# Patient Record
Sex: FEMALE | Employment: PART TIME | URBAN - METROPOLITAN AREA
[De-identification: names, ages, dates, MRNs, and addresses within clinical notes are randomized per-mention and may not be internally consistent; named-entity substitution may affect disease eponyms.]

---

## 2021-06-22 ENCOUNTER — TELEPHONE (OUTPATIENT)
Dept: NEUROLOGY | Facility: CLINIC | Age: 56
End: 2021-06-22

## 2021-06-22 NOTE — TELEPHONE ENCOUNTER
1st attempt to schedule Neurology referral- external referral not from work queue.- faxed referral

## 2021-09-03 ENCOUNTER — OFFICE VISIT (OUTPATIENT)
Dept: NEUROLOGY | Facility: CLINIC | Age: 56
End: 2021-09-03
Attending: PSYCHIATRY & NEUROLOGY
Payer: COMMERCIAL

## 2021-09-03 VITALS
SYSTOLIC BLOOD PRESSURE: 126 MMHG | HEART RATE: 64 BPM | DIASTOLIC BLOOD PRESSURE: 81 MMHG | OXYGEN SATURATION: 98 % | WEIGHT: 218.4 LBS

## 2021-09-03 DIAGNOSIS — Z82.0 FAMILY HISTORY OF PARKINSON'S DISEASE: Primary | ICD-10-CM

## 2021-09-03 PROCEDURE — 99204 OFFICE O/P NEW MOD 45 MIN: CPT | Performed by: PSYCHIATRY & NEUROLOGY

## 2021-09-03 PROCEDURE — G0463 HOSPITAL OUTPT CLINIC VISIT: HCPCS

## 2021-09-03 RX ORDER — PHENTERMINE HYDROCHLORIDE 37.5 MG/1
37.5 TABLET ORAL
COMMUNITY
Start: 2021-03-12

## 2021-09-03 RX ORDER — ASPIRIN 81 MG/1
TABLET, CHEWABLE ORAL
COMMUNITY

## 2021-09-03 RX ORDER — METFORMIN HCL 500 MG
1000 TABLET, EXTENDED RELEASE 24 HR ORAL
COMMUNITY
Start: 2021-06-11

## 2021-09-03 RX ORDER — LEVOTHYROXINE SODIUM 112 UG/1
112 TABLET ORAL
COMMUNITY
Start: 2021-06-11

## 2021-09-03 NOTE — PROGRESS NOTES
INITIAL NEUROLOGY CONSULTATION    DATE OF VISIT: 9/3/2021  CLINIC LOCATION: [facility]  MRN: [id number]  PATIENT NAME: Zuleika Mcgill  YOB: 1965    PRIMARY CARE PROVIDER: Ameena Eli PA-C.     REASON FOR VISIT:   Chief Complaint   Patient presents with     Parkinson     Long family history of Parkinson's     HISTORY OF PRESENT ILLNESS:                                                    Ms. Zuleika Mcgill is 56 year old right handed female patient with past medical history of hypertension, hyperlipidemia, obstructive sleep apnea, hypothyroidism, and vitamin D deficiency, who was seen in consultation today requested by Ameena Eli PA-C,  for family history of Parkinson's disease.    Per patient's report, she would like to get evaluated for possibility of Parkinson's disease because her brother was recently diagnosed with that, and her mother also had it.  Currently, she denies any neurological complaints, including anosmia, bradykinesia, acting out in sleep, micrographia, tremor, hypomimia, hypophonia, postural instability, and frequent falls.  She also denies any focal numbness or weakness.  No prior history of significant head injuries, seizures, or CNS infections.    According to Care Everywhere, recent laboratory evaluation from June 2021 includes unremarkable BMP, LFT, magnesium, CBC, vitamin B12 (432), vitamin D (42.5), ferritin (149.8), elevated LDL (174), and normal TSH (4.68).    No prior brain or spine imaging.    Review of Systems - the patient endorses swollen feet, varicose veins, thyroid problems, and arthritis. Otherwise, she denies any other complaints on 14-point comprehensive review of systems.  PAST MEDICAL/SURGICAL HISTORY:                                                    I personally reviewed patient's past medical and surgical history with the patient at today's visit.  MEDICATIONS:                                                    I  personally reviewed patient's medications and allergies with the patient at today's visit.  levothyroxine (SYNTHROID) 112 MCG tablet, Take 112 mcg by mouth  metFORMIN (GLUCOPHAGE-XR) 500 MG 24 hr tablet, Take 1,000 mg by mouth  phentermine (ADIPEX-P) 37.5 MG tablet, Take 37.5 mg by mouth  aspirin (ASA) 81 MG chewable tablet,     No current facility-administered medications on file prior to visit.    ALLERGIES:                                                    No Known Allergies  FAMILY/SOCIAL HISTORY:                                                    Family and social history was reviewed with the patient at today's visit.  Family history is positive for dementia and Parkinson's disease.  , lives with her .  Never smoker.  Denies current alcohol and recreational drug use.  Works full-time.  REVIEW OF SYSTEMS:                                                    Patient has completed a [facility] Patient Health History, including a 14-system review, which was personally reviewed, and pertinent positives are listed in HPI. She denies any additional problems on the further questioning.  EXAM:                                                    VITAL SIGNS:   BP (!) 145/86   Pulse 65   Wt 99.1 kg (218 lb 6.4 oz)   SpO2 98%    Repeat vital signs: /81   Pulse 64   Wt 99.1 kg (218 lb 6.4 oz)   SpO2 98% .  Mini-Cog Assessment:  Mini Cog Assessment  Clock Draw Score: 2 Normal  3 Item Recall: 3 objects recalled  Mini Cog Total Score: 5  Administered by: : Capri RAJPUT    General: pt is in NAD, cooperative.  Skin: normal turgor, moist mucous membranes, no lesions/rashes noticed.  HEENT: ATNC, EOMI, PERRL, white sclera, normal conjunctiva, no nystagmus or ptosis. No carotid bruits bilaterally.  Respiratory: lung sounds clear to auscultation bilaterally, no crackles, wheezes, rhonchi. Symmetric lung excursion, no accessory respiratory muscle use.  Cardiovascular: normal S1/S2, no  murmurs/rubs/gallops.   Abdomen: Not distended.  : deferred.    Neurological:  Mental: alert, follows commands, Mini Cog Total Score: 5/5 with 3/3 on memory recall, no aphasia or dysarthria. Fund of knowledge is appropriate for age.  Cranial Nerves:  CN II: visual acuity - able to accurately count fingers with each eye. Visual fields intact, fundi: discs sharp, no papilledema and normal vessels bilaterally.  CN III, IV, VI: EOM intact, pupils equal and reactive  CN V: facial sensation nl  CN VII: face symmetric, no facial droop  CN VIII: hearing normal  CN IX: palate elevation symmetric, uvula at midline  CN XI SCM normal, shoulder shrug nl  CN XII: tongue midline  Motor: Strength: 5/5 in all major groups of all extremities. Normal tone. No abnormal movements. No pronator drift b/l.  Reflexes: Triceps, biceps, brachioradialis reflexes normal and symmetric, patellar reflexes are difficult to elicit bilaterally, achilles reflex is slightly reduced on the right and normal on the left. No clonus noted. Toes are down-going b/l.   Sensory: temperature, light touch, pinprick, and vibration intact. Romberg: negative.  Coordination: FNF and heel-shin tests intact b/l.   Gait:  Normal, able to tandem, toe, and heel walk.  DATA:   LABS/IMAGING/OTHER STUDIES: I reviewed pertinent medical records, including Care Everywhere, as detailed in the history of present illness.  ASSESSMENT AND PLAN:      ASSESSMENT: Zuleika Mcgill is a 56 year old female patient with listed above past medical history, who presents for evaluation regarding possibility of Parkinson's disease due to positive family history.    We had a detailed discussion with the patient regarding her presenting complaints.  The neurological exam today is non-focal, except mild reduction of the right achilles reflex (likely chronic and unrelated to her present concerns).  Currently, she does not have any evidence of Parkinson's disease.  We reviewed typical  symptoms, and I advised the patient to come back if she develops them in the future.    Zuleika to follow up with Primary Care provider regarding elevated blood pressure.     DIAGNOSES:    ICD-10-CM    1. Family history of Parkinson's disease  Z82.0      PLAN: At today's visit we thoroughly discussed symptoms and signs of Parkinson's disease, disease time course, available treatment options, and the plan.  At the present time, I do not see any evidence of it.  I advised the patient to come back if she notices any new neurological symptoms.    Next follow-up appointment is on as needed basis.    Total Time: 45 minutes spent on the date of the encounter doing chart review, history and exam, documentation and further activities per the note.    Nicanor Farnsworth MD  [facility]  (Chart documentation was completed in part with Dragon voice-recognition software. Even though reviewed, some grammatical, spelling, and word errors may remain.)

## 2021-09-03 NOTE — PATIENT INSTRUCTIONS
AFTER VISIT SUMMARY (AVS):    At today's visit we thoroughly discussed symptoms and signs of Parkinson's disease, disease time course, available treatment options, and the plan.  At the present time, I do not see any evidence of it.  Please come back if you notice any new neurological symptoms.    Next follow-up appointment is on as needed basis.    Please do not hesitate to call me with any questions or concerns.    Thanks.

## 2021-09-03 NOTE — LETTER
9/3/2021         RE: Zuleika Mcgill  [address] [facility] [address]        Dear Colleague,    Thank you for referring your patient, Zuleika Mcgill, to the [facility] [place]. Please see a copy of my visit note below.    INITIAL NEUROLOGY CONSULTATION    DATE OF VISIT: 9/3/2021  CLINIC LOCATION: [facility]  MRN: [id number]  PATIENT NAME: Zuleika Mcgill  YOB: 1965    PRIMARY CARE PROVIDER: Ameena Eli PA-C.     REASON FOR VISIT:   Chief Complaint   Patient presents with     Parkinson     Long family history of Parkinson's     HISTORY OF PRESENT ILLNESS:                                                    Ms. Zuleika Mcgill is 56 year old right handed female patient with past medical history of hypertension, hyperlipidemia, obstructive sleep apnea, hypothyroidism, and vitamin D deficiency, who was seen in consultation today requested by Ameena Eli PA-C,  for family history of Parkinson's disease.    Per patient's report, she would like to get evaluated for possibility of Parkinson's disease because her brother was recently diagnosed with that, and her mother also had it.  Currently, she denies any neurological complaints, including anosmia, bradykinesia, acting out in sleep, micrographia, tremor, hypomimia, hypophonia, postural instability, and frequent falls.  She also denies any focal numbness or weakness.  No prior history of significant head injuries, seizures, or CNS infections.    According to Care Everywhere, recent laboratory evaluation from June 2021 includes unremarkable BMP, LFT, magnesium, CBC, vitamin B12 (432), vitamin D (42.5), ferritin (149.8), elevated LDL (174), and normal TSH (4.68).    No prior brain or spine imaging.    Review of Systems - the patient endorses swollen feet, varicose veins, thyroid problems, and arthritis. Otherwise, she denies any other complaints on 14-point comprehensive review of  systems.  PAST MEDICAL/SURGICAL HISTORY:                                                    I personally reviewed patient's past medical and surgical history with the patient at today's visit.  MEDICATIONS:                                                    I personally reviewed patient's medications and allergies with the patient at today's visit.  levothyroxine (SYNTHROID) 112 MCG tablet, Take 112 mcg by mouth  metFORMIN (GLUCOPHAGE-XR) 500 MG 24 hr tablet, Take 1,000 mg by mouth  phentermine (ADIPEX-P) 37.5 MG tablet, Take 37.5 mg by mouth  aspirin (ASA) 81 MG chewable tablet,     No current facility-administered medications on file prior to visit.    ALLERGIES:                                                    No Known Allergies  FAMILY/SOCIAL HISTORY:                                                    Family and social history was reviewed with the patient at today's visit.  Family history is positive for dementia and Parkinson's disease.  , lives with her .  Never smoker.  Denies current alcohol and recreational drug use.  Works full-time.  REVIEW OF SYSTEMS:                                                    Patient has completed a [facility] Patient Health History, including a 14-system review, which was personally reviewed, and pertinent positives are listed in HPI. She denies any additional problems on the further questioning.  EXAM:                                                    VITAL SIGNS:   BP (!) 145/86   Pulse 65   Wt 99.1 kg (218 lb 6.4 oz)   SpO2 98%    Repeat vital signs: /81   Pulse 64   Wt 99.1 kg (218 lb 6.4 oz)   SpO2 98% .  Mini-Cog Assessment:  Mini Cog Assessment  Clock Draw Score: 2 Normal  3 Item Recall: 3 objects recalled  Mini Cog Total Score: 5  Administered by: : Capri RAJPUT    General: pt is in NAD, cooperative.  Skin: normal turgor, moist mucous membranes, no lesions/rashes noticed.  HEENT: ATNC, EOMI, PERRL, white sclera, normal conjunctiva, no  nystagmus or ptosis. No carotid bruits bilaterally.  Respiratory: lung sounds clear to auscultation bilaterally, no crackles, wheezes, rhonchi. Symmetric lung excursion, no accessory respiratory muscle use.  Cardiovascular: normal S1/S2, no murmurs/rubs/gallops.   Abdomen: Not distended.  : deferred.    Neurological:  Mental: alert, follows commands, Mini Cog Total Score: 5/5 with 3/3 on memory recall, no aphasia or dysarthria. Fund of knowledge is appropriate for age.  Cranial Nerves:  CN II: visual acuity - able to accurately count fingers with each eye. Visual fields intact, fundi: discs sharp, no papilledema and normal vessels bilaterally.  CN III, IV, VI: EOM intact, pupils equal and reactive  CN V: facial sensation nl  CN VII: face symmetric, no facial droop  CN VIII: hearing normal  CN IX: palate elevation symmetric, uvula at midline  CN XI SCM normal, shoulder shrug nl  CN XII: tongue midline  Motor: Strength: 5/5 in all major groups of all extremities. Normal tone. No abnormal movements. No pronator drift b/l.  Reflexes: Triceps, biceps, brachioradialis reflexes normal and symmetric, patellar reflexes are difficult to elicit bilaterally, achilles reflex is slightly reduced on the right and normal on the left. No clonus noted. Toes are down-going b/l.   Sensory: temperature, light touch, pinprick, and vibration intact. Romberg: negative.  Coordination: FNF and heel-shin tests intact b/l.   Gait:  Normal, able to tandem, toe, and heel walk.  DATA:   LABS/IMAGING/OTHER STUDIES: I reviewed pertinent medical records, including Care Everywhere, as detailed in the history of present illness.  ASSESSMENT AND PLAN:      ASSESSMENT: Zuleika Mcgill is a 56 year old female patient with listed above past medical history, who presents for evaluation regarding possibility of Parkinson's disease due to positive family history.    We had a detailed discussion with the patient regarding her presenting complaints.  The  neurological exam today is non-focal, except mild reduction of the right achilles reflex (likely chronic and unrelated to her present concerns).  Currently, she does not have any evidence of Parkinson's disease.  We reviewed typical symptoms, and I advised the patient to come back if she develops them in the future.    Zuleika to follow up with Primary Care provider regarding elevated blood pressure.     DIAGNOSES:    ICD-10-CM    1. Family history of Parkinson's disease  Z82.0      PLAN: At today's visit we thoroughly discussed symptoms and signs of Parkinson's disease, disease time course, available treatment options, and the plan.  At the present time, I do not see any evidence of it.  I advised the patient to come back if she notices any new neurological symptoms.    Next follow-up appointment is on as needed basis.    Total Time: 45 minutes spent on the date of the encounter doing chart review, history and exam, documentation and further activities per the note.    Nicanor Farnsworth MD  [facility]  (Chart documentation was completed in part with Dragon voice-recognition software. Even though reviewed, some grammatical, spelling, and word errors may remain.)              Again, thank you for allowing me to participate in the care of your patient.        Sincerely,        Nicanor Farnsworth MD

## 2025-05-15 NOTE — PROGRESS NOTES
"ENT Consultation    Zuleika Mcgill who is a 60 year old female seen in consultation at the request of self.      History of Present Illness - Zuleika Mcgill is a 60 year old female presents with several month history of several issues the most important one is severe ear itch.  She feels a very very deep almost like she has to tear the ear off to itch in the back of the ear the back of the throat.  That comes and goes.  This started maybe after upper respiratory infection.  She also was clearing and coughing at the time.  Now actually feels that her voice is little bit more raspy in the last couple months.  Feels little bit of globus sensation even though denies any common variety GERD symptoms.  Feels \"\"scratchiness\" in the back of the throat by the hyoid area.  Feels some also thick secretions posteriorly.  No significant ear plugging eustachian tube dysfunction symptoms.      Patient is currently on Ozempic being prediabetic and overweight.  BP Readings from Last 1 Encounters:   05/28/25 122/76       BP noted to be well controlled today in office.     Zuleika IS NOT a smoker/uses chewing tobacco.      Past Medical History - History reviewed. No pertinent past medical history.    Current Medications -   Current Outpatient Medications:     aspirin (ASA) 81 MG chewable tablet, , Disp: , Rfl:     levothyroxine (SYNTHROID) 112 MCG tablet, Take 112 mcg by mouth, Disp: , Rfl:     metFORMIN (GLUCOPHAGE-XR) 500 MG 24 hr tablet, Take 1,000 mg by mouth, Disp: , Rfl:     OZEMPIC, 2 MG/DOSE, 8 MG/3ML pen, Inject 2 mg subcutaneously once a week., Disp: , Rfl:     phentermine (ADIPEX-P) 37.5 MG tablet, Take 37.5 mg by mouth, Disp: , Rfl:     Allergies -   Allergies   Allergen Reactions    Banana Other (See Comments)     Smell bothers pt  Smell bothers pt      Latex Swelling     Other reaction(s): *Unknown  Banana allergy- latex precautions  Banana allergy- latex precautions      Levothyroxine Itching and Other (See " "Comments)     Facial swelling  Allergic to the one with the yellow coating  Facial swelling  Allergic to the one with the yellow coating      Amoxicillin Rash     Causes yeast infections  Causes yeast infections         Social History -   Social History     Socioeconomic History    Marital status:    Tobacco Use    Smoking status: Never    Smokeless tobacco: Never   Substance and Sexual Activity    Alcohol use: Not Currently    Drug use: Never       Family History - History reviewed. No pertinent family history.    Review of Systems - As per HPI and PMHx, otherwise review of system review of the head and neck negative. Otherwise 10+ review of system is negative    Physical Exam  /76   Temp 97.2  F (36.2  C) (Temporal)   Ht 1.609 m (5' 3.35\")   Wt 94.8 kg (208 lb 15.9 oz)   BMI 36.62 kg/m    BMI: Body mass index is 36.62 kg/m .    General - The patient is well nourished and well developed, and appears to have good nutritional status.  Alert and oriented to person and place, answers questions and cooperates with examination appropriately.    SKIN - No suspicious lesions or rashes.  Respiration - No respiratory distress.  Head and Face - Normocephalic and atraumatic, with no gross asymmetry noted of the contour of the facial features.  The facial nerve is intact, with strong symmetric movements.    Voice and Breathing - The patient was breathing comfortably without the use of accessory muscles. The patients voice was clear and strong, and had appropriate pitch and quality.    Ears - Bilateral pinna and EACs with normal appearing overlying skin. Tympanic membrane intact with good mobility on pneumatic otoscopy bilaterally. Bony landmarks of the ossicular chain are normal. The tympanic membranes are normal in appearance. No retraction, perforation, or masses.  No fluid or purulence was seen in the external canal or the middle ear.     Eyes - Extraocular movements intact.  Sclera were not icteric or " injected, conjunctiva were pink and moist.    Mouth - Examination of the oral cavity showed pink, healthy oral mucosa. No lesions or ulcerations noted.  The tongue was mobile and midline, and the dentition were in good condition.      Throat - The walls of the oropharynx were smooth, pink, moist, symmetric, and had no lesions or ulcerations.  The tonsillar pillars and soft palate were symmetric.  The uvula was midline on elevation.  Thick clear secretions seen on posterior pharyngeal mucosa.  No erythema noted.    Neck - Normal midline excursion of the laryngotracheal complex during swallowing.  Full range of motion on passive movement.  Palpation of the occipital, submental, submandibular, internal jugular chain, and supraclavicular nodes did not demonstrate any abnormal lymph nodes or masses.  The carotid pulse was palpable bilaterally.  Palpation of the thyroid was soft and smooth, with no nodules or goiter appreciated.  The trachea was mobile and midline.    Nose - External contour is symmetric, no gross deflection or scars.  Nasal mucosa is pink and moist with no abnormal mucus.  The septum was midline and non-obstructive, turbinates of normal size and position.  No polyps, masses, or purulence noted on examination.    Neuro - Nonfocal neuro exam is normal, CN 2 through 12 intact, normal gait and muscle tone.      Performed in clinic today:  Attempts at mirror laryngoscopy were not possible due to gag reflex.  Therefore I proceeded with a fiberoptic examination.  First I sprayed both sides of the nose with a mixture of lidocaine and neosynephrine.  I then passed the scope through the nasal cavity.  The nasal cavity was unremarkable.  The nasopharynx was mucosally covered and symmetric.  However some thick secretions were noted around the eustachian tube orifices and posterior pharyngeal mucosa.  The Eustachian tube openings were unobstructed.  Going further down I had a clear view of the base of tongue which  had hypertrophic but symmetrical appearing lingual tonsillar tissue.  The base of tongue was free of lesions, and the vallecula was open.  The epiglottis was smooth and mucosally covered.  The supraglottic larynx was then clearly visualized.  The vocal cords moved smoothly and symmetrically, they were pearly white and no lesions were seen.  The pyriform sinuses were open, and the limited view of the postcricoid region did not show any lesions.  There are some erythema and thickening of the posterior commissure appreciated.  Dark Blue - [id number] Optim [facility]    Audiogram was obtained and appears to be normal.  Type a tympanograms appreciated bilaterally.  Normal pure-tone thresholds appreciated.  Word recognition score 100% bilaterally.  A/P - Zuleika CHAPO Mcgill is a 60 year old female presents with c what appears to be almost allergic type of process.  She has tried loratadine with some limited success.  She has tried topical mometasone in her ear canals with minimal success.  She has not tried anything for reflux.  At this point we discussed reflux related therapy with omeprazole 40 mg before supper avoidance of late-night meals especially heavier foods such as if fried and fatty foods and spicy foods in the evening.  Mid torso elevation in bed while asleep.  Caffeinated products limited to before 6 PM.  Avoidance of late-night meals in general is 3 hours before bedtime.  We discussed also using nasal topical steroids and cetirizine.  At this point will defer referral to allergy.  Patient will return in 2 months.    Danial Peoples MD

## 2025-05-21 NOTE — PROGRESS NOTES
AUDIOLOGY REPORT    SUBJECTIVE:  Patient was self referred to [facility] Audiology for a hearing examination.      The patient reports for the last few years she has felt intermittent bilateral ear itchiness. The patient reports she saw doctor in [facility]  which prescribed some cream made to help with the ear itchiness. The patient reports the cream helps a little bit for about 10 days and then her ears start to itch again. The patient reports some tinnitus bilaterally.The patient denies otalgia, otorrhea, dizziness, aural fullness, ear surgeries, and family history of hearing loss.  The patient was unaccompanied at today's appointment.    OBJECTIVE:  Otoscopy:  Otoscopic exam indicates ears are clear of cerumen bilaterally     Tympanograms:    RIGHT: normal eardrum mobility    LEFT:   normal eardrum mobility    Reflexes: (reported by stimulus ear): 1000 Hz  RIGHT: Ipsilateral is present at normal levels  RIGHT: Contralateral is present at normal levels  LEFT:   Ipsilateral is present at normal levels  LEFT:   Contralateral is present at normal levels    Thresholds:   Pure Tone Thresholds assessed using conventional audiometry with good  reliability from 250-8000 Hz bilaterally using insert earphones and circumaural headphones     RIGHT:  normal hearing sensitivity    LEFT:    sensorineural hearing loss    Speech Reception Threshold:    RIGHT: 20 dB HL    LEFT:   20 dB HL    Word Recognition Score:     RIGHT: 100% at 60 dB HL using NU-6 recorded word list.    LEFT:   100% at 60 dB HL using NU-6 recorded word list.    ASSESSMENT: Today's testing revealed normal hearing sensitivity bilaterally.  Today s results were discussed with the patient in detail.     PLAN: Follow up with ENT.      Jake Chu, CCC-A  Doctor of Audiology, [place] #[id number]   May 28, 2025

## 2025-05-28 ENCOUNTER — OFFICE VISIT (OUTPATIENT)
Dept: AUDIOLOGY | Facility: OTHER | Age: 60
End: 2025-05-28
Payer: COMMERCIAL

## 2025-05-28 ENCOUNTER — OFFICE VISIT (OUTPATIENT)
Dept: OTOLARYNGOLOGY | Facility: OTHER | Age: 60
End: 2025-05-28
Payer: COMMERCIAL

## 2025-05-28 VITALS
HEIGHT: 63 IN | BODY MASS INDEX: 37.03 KG/M2 | DIASTOLIC BLOOD PRESSURE: 76 MMHG | TEMPERATURE: 97.2 F | WEIGHT: 209 LBS | SYSTOLIC BLOOD PRESSURE: 122 MMHG

## 2025-05-28 DIAGNOSIS — H92.03 OTALGIA, BILATERAL: ICD-10-CM

## 2025-05-28 DIAGNOSIS — H93.13 TINNITUS OF BOTH EARS: Primary | ICD-10-CM

## 2025-05-28 DIAGNOSIS — K21.9 LPRD (LARYNGOPHARYNGEAL REFLUX DISEASE): Primary | ICD-10-CM

## 2025-05-28 DIAGNOSIS — L29.9 EAR ITCH: ICD-10-CM

## 2025-05-28 DIAGNOSIS — R49.0 DYSPHONIA: ICD-10-CM

## 2025-05-28 DIAGNOSIS — J03.90 LINGUAL TONSILLITIS: ICD-10-CM

## 2025-05-28 DIAGNOSIS — R09.A2 GLOBUS SENSATION: ICD-10-CM

## 2025-05-28 PROCEDURE — 92550 TYMPANOMETRY & REFLEX THRESH: CPT

## 2025-05-28 PROCEDURE — 92557 COMPREHENSIVE HEARING TEST: CPT

## 2025-05-28 RX ORDER — OMEPRAZOLE 40 MG/1
40 CAPSULE, DELAYED RELEASE ORAL DAILY
Qty: 30 CAPSULE | Refills: 1 | Status: SHIPPED | OUTPATIENT
Start: 2025-05-28

## 2025-05-28 RX ORDER — SEMAGLUTIDE 2.68 MG/ML
2 INJECTION, SOLUTION SUBCUTANEOUS WEEKLY
COMMUNITY
Start: 2025-05-13

## 2025-05-28 RX ORDER — MOMETASONE FUROATE MONOHYDRATE 50 UG/1
2 SPRAY, METERED NASAL DAILY
Qty: 17 G | Refills: 2 | Status: SHIPPED | OUTPATIENT
Start: 2025-05-28

## 2025-05-28 NOTE — LETTER
"5/28/2025      Zuleika Mcgill  [id number] [facility] [address]      Dear Colleague,    Thank you for referring your patient, Zuleika Mcgill, to the [facility]. Please see a copy of my visit note below.    ENT Consultation    Zuleika Mcgill who is a 60 year old female seen in consultation at the request of self.      History of Present Illness - Zuleika Mcgill is a 60 year old female presents with several month history of several issues the most important one is severe ear itch.  She feels a very very deep almost like she has to tear the ear off to itch in the back of the ear the back of the throat.  That comes and goes.  This started maybe after upper respiratory infection.  She also was clearing and coughing at the time.  Now actually feels that her voice is little bit more raspy in the last couple months.  Feels little bit of globus sensation even though denies any common variety GERD symptoms.  Feels \"\"scratchiness\" in the back of the throat by the hyoid area.  Feels some also thick secretions posteriorly.  No significant ear plugging eustachian tube dysfunction symptoms.      Patient is currently on Ozempic being prediabetic and overweight.  BP Readings from Last 1 Encounters:   05/28/25 122/76       BP noted to be well controlled today in office.     Zuleika IS NOT a smoker/uses chewing tobacco.      Past Medical History - History reviewed. No pertinent past medical history.    Current Medications -   Current Outpatient Medications:      aspirin (ASA) 81 MG chewable tablet, , Disp: , Rfl:      levothyroxine (SYNTHROID) 112 MCG tablet, Take 112 mcg by mouth, Disp: , Rfl:      metFORMIN (GLUCOPHAGE-XR) 500 MG 24 hr tablet, Take 1,000 mg by mouth, Disp: , Rfl:      OZEMPIC, 2 MG/DOSE, 8 MG/3ML pen, Inject 2 mg subcutaneously once a week., Disp: , Rfl:      phentermine (ADIPEX-P) 37.5 MG tablet, Take 37.5 mg by mouth, Disp: , Rfl:     Allergies -   Allergies   Allergen " "Reactions     Banana Other (See Comments)     Smell bothers pt  Smell bothers pt       Latex Swelling     Other reaction(s): *Unknown  Banana allergy- latex precautions  Banana allergy- latex precautions       Levothyroxine Itching and Other (See Comments)     Facial swelling  Allergic to the one with the yellow coating  Facial swelling  Allergic to the one with the yellow coating       Amoxicillin Rash     Causes yeast infections  Causes yeast infections         Social History -   Social History     Socioeconomic History     Marital status:    Tobacco Use     Smoking status: Never     Smokeless tobacco: Never   Substance and Sexual Activity     Alcohol use: Not Currently     Drug use: Never       Family History - History reviewed. No pertinent family history.    Review of Systems - As per HPI and PMHx, otherwise review of system review of the head and neck negative. Otherwise 10+ review of system is negative    Physical Exam  /76   Temp 97.2  F (36.2  C) (Temporal)   Ht 1.609 m (5' 3.35\")   Wt 94.8 kg (208 lb 15.9 oz)   BMI 36.62 kg/m    BMI: Body mass index is 36.62 kg/m .    General - The patient is well nourished and well developed, and appears to have good nutritional status.  Alert and oriented to person and place, answers questions and cooperates with examination appropriately.    SKIN - No suspicious lesions or rashes.  Respiration - No respiratory distress.  Head and Face - Normocephalic and atraumatic, with no gross asymmetry noted of the contour of the facial features.  The facial nerve is intact, with strong symmetric movements.    Voice and Breathing - The patient was breathing comfortably without the use of accessory muscles. The patients voice was clear and strong, and had appropriate pitch and quality.    Ears - Bilateral pinna and EACs with normal appearing overlying skin. Tympanic membrane intact with good mobility on pneumatic otoscopy bilaterally. Bony landmarks of the ossicular " chain are normal. The tympanic membranes are normal in appearance. No retraction, perforation, or masses.  No fluid or purulence was seen in the external canal or the middle ear.     Eyes - Extraocular movements intact.  Sclera were not icteric or injected, conjunctiva were pink and moist.    Mouth - Examination of the oral cavity showed pink, healthy oral mucosa. No lesions or ulcerations noted.  The tongue was mobile and midline, and the dentition were in good condition.      Throat - The walls of the oropharynx were smooth, pink, moist, symmetric, and had no lesions or ulcerations.  The tonsillar pillars and soft palate were symmetric.  The uvula was midline on elevation.  Thick clear secretions seen on posterior pharyngeal mucosa.  No erythema noted.    Neck - Normal midline excursion of the laryngotracheal complex during swallowing.  Full range of motion on passive movement.  Palpation of the occipital, submental, submandibular, internal jugular chain, and supraclavicular nodes did not demonstrate any abnormal lymph nodes or masses.  The carotid pulse was palpable bilaterally.  Palpation of the thyroid was soft and smooth, with no nodules or goiter appreciated.  The trachea was mobile and midline.    Nose - External contour is symmetric, no gross deflection or scars.  Nasal mucosa is pink and moist with no abnormal mucus.  The septum was midline and non-obstructive, turbinates of normal size and position.  No polyps, masses, or purulence noted on examination.    Neuro - Nonfocal neuro exam is normal, CN 2 through 12 intact, normal gait and muscle tone.      Performed in clinic today:  Attempts at mirror laryngoscopy were not possible due to gag reflex.  Therefore I proceeded with a fiberoptic examination.  First I sprayed both sides of the nose with a mixture of lidocaine and neosynephrine.  I then passed the scope through the nasal cavity.  The nasal cavity was unremarkable.  The nasopharynx was mucosally  covered and symmetric.  However some thick secretions were noted around the eustachian tube orifices and posterior pharyngeal mucosa.  The Eustachian tube openings were unobstructed.  Going further down I had a clear view of the base of tongue which had hypertrophic but symmetrical appearing lingual tonsillar tissue.  The base of tongue was free of lesions, and the vallecula was open.  The epiglottis was smooth and mucosally covered.  The supraglottic larynx was then clearly visualized.  The vocal cords moved smoothly and symmetrically, they were pearly white and no lesions were seen.  The pyriform sinuses were open, and the limited view of the postcricoid region did not show any lesions.  There are some erythema and thickening of the posterior commissure appreciated.  Dark Blue - EE1194 [facility]    Audiogram was obtained and appears to be normal.  Type a tympanograms appreciated bilaterally.  Normal pure-tone thresholds appreciated.  Word recognition score 100% bilaterally.  A/P - Zuleika CHAPO Mcgill is a 60 year old female presents with c what appears to be almost allergic type of process.  She has tried loratadine with some limited success.  She has tried topical mometasone in her ear canals with minimal success.  She has not tried anything for reflux.  At this point we discussed reflux related therapy with omeprazole 40 mg before supper avoidance of late-night meals especially heavier foods such as if fried and fatty foods and spicy foods in the evening.  Mid torso elevation in bed while asleep.  Caffeinated products limited to before 6 PM.  Avoidance of late-night meals in general is 3 hours before bedtime.  We discussed also using nasal topical steroids and cetirizine.  At this point will defer referral to allergy.  Patient will return in 2 months.    Danial Peoples MD     Again, thank you for allowing me to participate in the care of your patient.        Sincerely,        Danial Peoples MD,  MD    Electronically signed

## 2025-07-10 NOTE — PROGRESS NOTES
History of Present Illness - Zuleika Mcgill is a 60 year old female presenting in clinic today for a recheck on Patient presents with:  Follow Up  Gastrophageal Reflux    Patient initially presented with symptoms consistent with laryngopharyngeal reflux disease.  She has been aggressively treated with omeprazole before supper proper lifestyle changes and indeed is feeling significantly better.  Throat clearing is much improved.  She also had ear itch that was so bothersome to her that she felt was more allergic mediated.    She was not able to see allergist yet.    BP Readings from Last 1 Encounters:   05/28/25 122/76       BP noted to be well controlled today in office.     Zuleika IS NOT a smoker/uses chewing tobacco.        Past Medical History - No past medical history on file.    Current Medications -   Current Outpatient Medications:     aspirin (ASA) 81 MG chewable tablet, , Disp: , Rfl:     levothyroxine (SYNTHROID) 112 MCG tablet, Take 112 mcg by mouth, Disp: , Rfl:     metFORMIN (GLUCOPHAGE-XR) 500 MG 24 hr tablet, Take 1,000 mg by mouth, Disp: , Rfl:     mometasone (NASONEX) 50 MCG/ACT nasal spray, Spray 2 sprays into both nostrils daily., Disp: 17 g, Rfl: 2    omeprazole (PRILOSEC) 40 MG DR capsule, Take 1 capsule (40 mg) by mouth daily., Disp: 30 capsule, Rfl: 1    OZEMPIC, 2 MG/DOSE, 8 MG/3ML pen, Inject 2 mg subcutaneously once a week., Disp: , Rfl:     phentermine (ADIPEX-P) 37.5 MG tablet, Take 37.5 mg by mouth, Disp: , Rfl:     Allergies -   Allergies   Allergen Reactions    Banana Other (See Comments)     Smell bothers pt  Smell bothers pt      Latex Swelling     Other reaction(s): *Unknown  Banana allergy- latex precautions  Banana allergy- latex precautions      Levothyroxine Itching and Other (See Comments)     Facial swelling  Allergic to the one with the yellow coating  Facial swelling  Allergic to the one with the yellow coating      Amoxicillin Rash     Causes yeast infections  Causes  yeast infections         Social History -   Social History     Socioeconomic History    Marital status:    Tobacco Use    Smoking status: Never    Smokeless tobacco: Never   Substance and Sexual Activity    Alcohol use: Not Currently    Drug use: Never     Social Drivers of Health     Financial Resource Strain: Low Risk  (4/3/2025)    Received from [facility] [employer]    Financial Resource Strain     Difficulty of Paying Living Expenses: 3   Food Insecurity: No Food Insecurity (4/3/2025)    Received from [facility] [employer] [facility]    Food Insecurity     Do you worry your food will run out before you are able to buy more?: 1   Transportation Needs: No Transportation Needs (4/3/2025)    Received from [facility] [employer] [facility]    Transportation Needs     Does lack of transportation keep you from medical appointments?: 1     Does lack of transportation keep you from work, meetings or getting things that you need?: 1   Social Connections: Socially Integrated (4/3/2025)    Received from [facility] [employer] [facility]    Social Connections     Do you often feel lonely or isolated from those around you?: 0   Housing Stability: Low Risk  (4/3/2025)    Received from [facility] [employer] [facility]    Housing Stability     What is your housing situation today?: 1       Family History - No family history on file.    Review of Systems - As per HPI and PMHx, otherwise review of system review of the head and neck negative. Otherwise 10+ review of system is negative    Physical Exam  There were no vitals taken for this visit.  BMI: There is no height or weight on file to calculate BMI.    General - The patient is well nourished and well developed, and appears to have good nutritional status.  Alert and oriented to person and place, answers questions and cooperates with examination appropriately.    SKIN - No suspicious lesions or  rashes.  Respiration - No respiratory distress.  Head and Face - Normocephalic and atraumatic, with no gross asymmetry noted of the contour of the facial features.  The facial nerve is intact, with strong symmetric movements.    Voice and Breathing - The patient was breathing comfortably without the use of accessory muscles. The patients voice was clear and strong, and had appropriate pitch and quality.    Ears - Bilateral pinna and EACs with normal appearing overlying skin. Tympanic membrane intact with good mobility on pneumatic otoscopy bilaterally. Bony landmarks of the ossicular chain are normal. The tympanic membranes are normal in appearance. No retraction, perforation, or masses.  No fluid or purulence was seen in the external canal or the middle ear.     Eyes - Extraocular movements intact.  Sclera were not icteric or injected, conjunctiva were pink and moist.    Mouth - Examination of the oral cavity showed pink, healthy oral mucosa. No lesions or ulcerations noted.  The tongue was mobile and midline, and the dentition were in good condition.      Throat - The walls of the oropharynx were smooth, pink, moist, symmetric, and had no lesions or ulcerations.  The tonsillar pillars and soft palate were symmetric.  The uvula was midline on elevation.    Neck - Normal midline excursion of the laryngotracheal complex during swallowing.  Full range of motion on passive movement.  Palpation of the occipital, submental, submandibular, internal jugular chain, and supraclavicular nodes did not demonstrate any abnormal lymph nodes or masses.  The carotid pulse was palpable bilaterally.  Palpation of the thyroid was soft and smooth, with no nodules or goiter appreciated.  The trachea was mobile and midline.    Nose - External contour is symmetric, no gross deflection or scars.  Nasal mucosa is pink and moist with no abnormal mucus.  The septum was midline and non-obstructive, turbinates of normal size and position.  No  polyps, masses, or purulence noted on examination.    Neuro - Nonfocal neuro exam is normal, CN 2 through 12 intact, normal gait and muscle tone.          A/P - Zuleika CHAPO Mcgill is a 60 year old female Patient presents with:  Follow Up  Gastrophageal Reflux    Patient is much improved with respect to laryngopharyngeal reflux symptomatology.  Still pending allergy evaluation.  Would like to see how she does with allergy evaluation and treatment.  Would like to recheck in 6 months.    Zuleika should follow up in 6 months.      In the meantime she will continue her current regimen for reflux .      Danial Peoples MD

## 2025-07-24 ENCOUNTER — OFFICE VISIT (OUTPATIENT)
Dept: OTOLARYNGOLOGY | Facility: CLINIC | Age: 60
End: 2025-07-24
Payer: COMMERCIAL

## 2025-07-24 VITALS
DIASTOLIC BLOOD PRESSURE: 70 MMHG | RESPIRATION RATE: 16 BRPM | OXYGEN SATURATION: 96 % | HEART RATE: 76 BPM | SYSTOLIC BLOOD PRESSURE: 112 MMHG

## 2025-07-24 DIAGNOSIS — J30.89 NON-SEASONAL ALLERGIC RHINITIS, UNSPECIFIED TRIGGER: Primary | ICD-10-CM

## 2025-07-24 PROCEDURE — 3078F DIAST BP <80 MM HG: CPT | Performed by: OTOLARYNGOLOGY

## 2025-07-24 PROCEDURE — 3074F SYST BP LT 130 MM HG: CPT | Performed by: OTOLARYNGOLOGY

## 2025-07-24 PROCEDURE — 99213 OFFICE O/P EST LOW 20 MIN: CPT | Performed by: OTOLARYNGOLOGY

## 2025-07-24 RX ORDER — CETIRIZINE HYDROCHLORIDE 10 MG/1
10 TABLET ORAL DAILY
Qty: 90 TABLET | Refills: 0 | Status: SHIPPED | OUTPATIENT
Start: 2025-07-24

## 2025-07-24 NOTE — LETTER
7/24/2025      Zuleika Mcgill  [address] [facility] [address]      Dear Colleague,    Thank you for referring your patient, Zuleika Mcgill, to the [facility]. Please see a copy of my visit note below.    History of Present Illness - Zuleika Mcgill is a 60 year old female presenting in clinic today for a recheck on Patient presents with:  Follow Up  Gastrophageal Reflux    Patient initially presented with symptoms consistent with laryngopharyngeal reflux disease.  She has been aggressively treated with omeprazole before supper proper lifestyle changes and indeed is feeling significantly better.  Throat clearing is much improved.  She also had ear itch that was so bothersome to her that she felt was more allergic mediated.    She was not able to see allergist yet.    BP Readings from Last 1 Encounters:   05/28/25 122/76       BP noted to be well controlled today in office.     Zuleika IS NOT a smoker/uses chewing tobacco.        Past Medical History - No past medical history on file.    Current Medications -   Current Outpatient Medications:      aspirin (ASA) 81 MG chewable tablet, , Disp: , Rfl:      levothyroxine (SYNTHROID) 112 MCG tablet, Take 112 mcg by mouth, Disp: , Rfl:      metFORMIN (GLUCOPHAGE-XR) 500 MG 24 hr tablet, Take 1,000 mg by mouth, Disp: , Rfl:      mometasone (NASONEX) 50 MCG/ACT nasal spray, Spray 2 sprays into both nostrils daily., Disp: 17 g, Rfl: 2     omeprazole (PRILOSEC) 40 MG DR capsule, Take 1 capsule (40 mg) by mouth daily., Disp: 30 capsule, Rfl: 1     OZEMPIC, 2 MG/DOSE, 8 MG/3ML pen, Inject 2 mg subcutaneously once a week., Disp: , Rfl:      phentermine (ADIPEX-P) 37.5 MG tablet, Take 37.5 mg by mouth, Disp: , Rfl:     Allergies -   Allergies   Allergen Reactions     Banana Other (See Comments)     Smell bothers pt  Smell bothers pt       Latex Swelling     Other reaction(s): *Unknown  Banana allergy- latex precautions  Banana allergy- latex  precautions       Levothyroxine Itching and Other (See Comments)     Facial swelling  Allergic to the one with the yellow coating  Facial swelling  Allergic to the one with the yellow coating       Amoxicillin Rash     Causes yeast infections  Causes yeast infections         Social History -   Social History     Socioeconomic History     Marital status:    Tobacco Use     Smoking status: Never     Smokeless tobacco: Never   Substance and Sexual Activity     Alcohol use: Not Currently     Drug use: Never     Social Drivers of Health     Financial Resource Strain: Low Risk  (4/3/2025)    Received from [email or web address]    Financial Resource Strain      Difficulty of Paying Living Expenses: 3   Food Insecurity: No Food Insecurity (4/3/2025)    Received from [facility]    Food Insecurity      Do you worry your food will run out before you are able to buy more?: 1   Transportation Needs: No Transportation Needs (4/3/2025)    Received from [facility]    Transportation Needs      Does lack of transportation keep you from medical appointments?: 1      Does lack of transportation keep you from work, meetings or getting things that you need?: 1   Social Connections: Socially Integrated (4/3/2025)    Received from [employer] [facility]    Social Connections      Do you often feel lonely or isolated from those around you?: 0   Housing Stability: Low Risk  (4/3/2025)    Received from [employer] [facility]    Housing Stability      What is your housing situation today?: 1       Family History - No family history on file.    Review of Systems - As per HPI and PMHx, otherwise review of system review of the head and neck negative. Otherwise 10+ review of system is negative    Physical Exam  There were no vitals taken for this visit.  BMI: There is no height or weight on file to calculate  BMI.    General - The patient is well nourished and well developed, and appears to have good nutritional status.  Alert and oriented to person and place, answers questions and cooperates with examination appropriately.    SKIN - No suspicious lesions or rashes.  Respiration - No respiratory distress.  Head and Face - Normocephalic and atraumatic, with no gross asymmetry noted of the contour of the facial features.  The facial nerve is intact, with strong symmetric movements.    Voice and Breathing - The patient was breathing comfortably without the use of accessory muscles. The patients voice was clear and strong, and had appropriate pitch and quality.    Ears - Bilateral pinna and EACs with normal appearing overlying skin. Tympanic membrane intact with good mobility on pneumatic otoscopy bilaterally. Bony landmarks of the ossicular chain are normal. The tympanic membranes are normal in appearance. No retraction, perforation, or masses.  No fluid or purulence was seen in the external canal or the middle ear.     Eyes - Extraocular movements intact.  Sclera were not icteric or injected, conjunctiva were pink and moist.    Mouth - Examination of the oral cavity showed pink, healthy oral mucosa. No lesions or ulcerations noted.  The tongue was mobile and midline, and the dentition were in good condition.      Throat - The walls of the oropharynx were smooth, pink, moist, symmetric, and had no lesions or ulcerations.  The tonsillar pillars and soft palate were symmetric.  The uvula was midline on elevation.    Neck - Normal midline excursion of the laryngotracheal complex during swallowing.  Full range of motion on passive movement.  Palpation of the occipital, submental, submandibular, internal jugular chain, and supraclavicular nodes did not demonstrate any abnormal lymph nodes or masses.  The carotid pulse was palpable bilaterally.  Palpation of the thyroid was soft and smooth, with no nodules or goiter appreciated.   The trachea was mobile and midline.    Nose - External contour is symmetric, no gross deflection or scars.  Nasal mucosa is pink and moist with no abnormal mucus.  The septum was midline and non-obstructive, turbinates of normal size and position.  No polyps, masses, or purulence noted on examination.    Neuro - Nonfocal neuro exam is normal, CN 2 through 12 intact, normal gait and muscle tone.          A/P - Zuleikaemeli Mcgill is a 60 year old female Patient presents with:  Follow Up  Gastrophageal Reflux    Patient is much improved with respect to laryngopharyngeal reflux symptomatology.  Still pending allergy evaluation.  Would like to see how she does with allergy evaluation and treatment.  Would like to recheck in 6 months.    Zuleika should follow up in 6 months.      In the meantime she will continue her current regimen for reflux .      Danial Peoples MD          Again, thank you for allowing me to participate in the care of your patient.        Sincerely,        Danial Peoples MD, MD    Electronically signed

## 2025-07-28 ENCOUNTER — PATIENT OUTREACH (OUTPATIENT)
Dept: CARE COORDINATION | Facility: CLINIC | Age: 60
End: 2025-07-28
Payer: COMMERCIAL